# Patient Record
Sex: MALE | Race: OTHER
[De-identification: names, ages, dates, MRNs, and addresses within clinical notes are randomized per-mention and may not be internally consistent; named-entity substitution may affect disease eponyms.]

---

## 2019-06-07 ENCOUNTER — HOSPITAL ENCOUNTER (OUTPATIENT)
Dept: HOSPITAL 8 - ED | Age: 39
LOS: 1 days | Discharge: HOME | End: 2019-06-08
Attending: EMERGENCY MEDICINE
Payer: MEDICAID

## 2019-06-07 VITALS — HEIGHT: 71 IN | BODY MASS INDEX: 21.02 KG/M2 | WEIGHT: 150.13 LBS
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
## 2019-06-07 DIAGNOSIS — Z72.89: ICD-10-CM
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
## 2019-06-07 DIAGNOSIS — S68.127A: Primary | ICD-10-CM

## 2019-06-07 DIAGNOSIS — F17.210: ICD-10-CM
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
## 2019-06-07 DIAGNOSIS — Z23: ICD-10-CM

## 2019-06-07 DIAGNOSIS — F15.10: ICD-10-CM
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
## 2019-06-07 PROCEDURE — 99285 EMERGENCY DEPT VISIT HI MDM: CPT

## 2019-06-07 PROCEDURE — 90471 IMMUNIZATION ADMIN: CPT

## 2019-06-07 PROCEDURE — 26236 PARTIAL REMOVAL FINGER BONE: CPT

## 2019-06-07 PROCEDURE — 90715 TDAP VACCINE 7 YRS/> IM: CPT

## 2019-06-07 PROCEDURE — 73140 X-RAY EXAM OF FINGER(S): CPT

## 2019-06-08 VITALS — DIASTOLIC BLOOD PRESSURE: 88 MMHG | SYSTOLIC BLOOD PRESSURE: 143 MMHG

## 2019-06-08 VITALS — SYSTOLIC BLOOD PRESSURE: 143 MMHG | DIASTOLIC BLOOD PRESSURE: 91 MMHG

## 2019-06-08 VITALS — SYSTOLIC BLOOD PRESSURE: 126 MMHG | DIASTOLIC BLOOD PRESSURE: 78 MMHG

## 2019-06-09 ENCOUNTER — HOSPITAL ENCOUNTER (EMERGENCY)
Facility: MEDICAL CENTER | Age: 39
End: 2019-06-09
Attending: EMERGENCY MEDICINE
Payer: MEDICAID

## 2019-06-09 VITALS
RESPIRATION RATE: 18 BRPM | OXYGEN SATURATION: 96 % | DIASTOLIC BLOOD PRESSURE: 95 MMHG | SYSTOLIC BLOOD PRESSURE: 137 MMHG | HEART RATE: 96 BPM | BODY MASS INDEX: 23.52 KG/M2 | TEMPERATURE: 96.7 F | HEIGHT: 71 IN | WEIGHT: 168 LBS

## 2019-06-09 DIAGNOSIS — G89.18 POST-OPERATIVE PAIN: ICD-10-CM

## 2019-06-09 PROCEDURE — 99283 EMERGENCY DEPT VISIT LOW MDM: CPT

## 2019-06-09 PROCEDURE — A9270 NON-COVERED ITEM OR SERVICE: HCPCS | Performed by: EMERGENCY MEDICINE

## 2019-06-09 PROCEDURE — 700102 HCHG RX REV CODE 250 W/ 637 OVERRIDE(OP): Performed by: EMERGENCY MEDICINE

## 2019-06-09 RX ORDER — HYDROCODONE BITARTRATE AND ACETAMINOPHEN 5; 325 MG/1; MG/1
1 TABLET ORAL EVERY 6 HOURS PRN
Qty: 15 TAB | Refills: 0 | Status: SHIPPED | OUTPATIENT
Start: 2019-06-09 | End: 2019-06-13

## 2019-06-09 RX ORDER — HYDROCODONE BITARTRATE AND ACETAMINOPHEN 10; 325 MG/1; MG/1
1 TABLET ORAL ONCE
Status: COMPLETED | OUTPATIENT
Start: 2019-06-09 | End: 2019-06-09

## 2019-06-09 RX ADMIN — HYDROCODONE BITARTRATE AND ACETAMINOPHEN 1 TABLET: 10; 325 TABLET ORAL at 05:49

## 2019-06-09 ASSESSMENT — ENCOUNTER SYMPTOMS
FEVER: 0
VOMITING: 0

## 2019-06-09 ASSESSMENT — PAIN DESCRIPTION - DESCRIPTORS: DESCRIPTORS: SHOOTING
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
## 2019-06-09 NOTE — ED TRIAGE NOTES
"Josse Lincoln  38 y.o.    /95   Pulse 96   Temp 35.9 °C (96.7 °F) (Temporal)   Resp 18   Ht 1.803 m (5' 11\")   Wt 76.2 kg (168 lb)   SpO2 96%   BMI 23.43 kg/m²   Chief Complaint   Patient presents with   • Post-Op Pain     Left pinky finger amputation at this first joint, sx this am at [facility]. Pt has swelling and pains, given Abx and pain meds with no relief.      Pt amb to triage with steady gait for above complaint.   Pt is alert and oriented, speaking in full sentences, follows commands and responds appropriately to questions. NAD. Resp are even and unlabored.  Pt placed in lobby. Pt educated on triage process and encouraged to alert staff for any changes.     "
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
## 2019-09-06 ENCOUNTER — HOSPITAL ENCOUNTER (EMERGENCY)
Facility: MEDICAL CENTER | Age: 39
End: 2019-09-06
Attending: EMERGENCY MEDICINE
Payer: MEDICAID

## 2019-09-06 VITALS
WEIGHT: 166.23 LBS | TEMPERATURE: 96.8 F | HEIGHT: 71 IN | DIASTOLIC BLOOD PRESSURE: 84 MMHG | SYSTOLIC BLOOD PRESSURE: 123 MMHG | OXYGEN SATURATION: 100 % | HEART RATE: 83 BPM | BODY MASS INDEX: 23.27 KG/M2 | RESPIRATION RATE: 16 BRPM

## 2019-09-06 VITALS
BODY MASS INDEX: 22.84 KG/M2 | HEIGHT: 71 IN | OXYGEN SATURATION: 98 % | DIASTOLIC BLOOD PRESSURE: 78 MMHG | HEART RATE: 82 BPM | SYSTOLIC BLOOD PRESSURE: 129 MMHG | RESPIRATION RATE: 15 BRPM | TEMPERATURE: 98 F | WEIGHT: 163.14 LBS

## 2019-09-06 DIAGNOSIS — K02.9 DENTAL CARIES: ICD-10-CM

## 2019-09-06 DIAGNOSIS — K08.89 PAIN, DENTAL: ICD-10-CM

## 2019-09-06 PROCEDURE — 99284 EMERGENCY DEPT VISIT MOD MDM: CPT

## 2019-09-06 PROCEDURE — A9270 NON-COVERED ITEM OR SERVICE: HCPCS | Performed by: EMERGENCY MEDICINE

## 2019-09-06 PROCEDURE — 700102 HCHG RX REV CODE 250 W/ 637 OVERRIDE(OP): Performed by: EMERGENCY MEDICINE

## 2019-09-06 PROCEDURE — 700101 HCHG RX REV CODE 250: Performed by: EMERGENCY MEDICINE

## 2019-09-06 PROCEDURE — 99283 EMERGENCY DEPT VISIT LOW MDM: CPT

## 2019-09-06 RX ORDER — AMOXICILLIN 875 MG/1
875 TABLET, COATED ORAL 2 TIMES DAILY
Qty: 20 TAB | Refills: 0 | Status: SHIPPED | OUTPATIENT
Start: 2019-09-06

## 2019-09-06 RX ORDER — IBUPROFEN 600 MG/1
600 TABLET ORAL ONCE
Status: COMPLETED | OUTPATIENT
Start: 2019-09-06 | End: 2019-09-06

## 2019-09-06 RX ORDER — BUPIVACAINE HYDROCHLORIDE AND EPINEPHRINE 5; 5 MG/ML; UG/ML
20 INJECTION, SOLUTION EPIDURAL; INTRACAUDAL; PERINEURAL ONCE
Status: DISCONTINUED | OUTPATIENT
Start: 2019-09-06 | End: 2019-09-06 | Stop reason: HOSPADM

## 2019-09-06 RX ORDER — NAPROXEN 500 MG/1
500 TABLET ORAL 2 TIMES DAILY WITH MEALS
Qty: 20 TAB | Refills: 0 | Status: SHIPPED | OUTPATIENT
Start: 2019-09-06

## 2019-09-06 RX ORDER — AMOXICILLIN 500 MG/1
500 CAPSULE ORAL ONCE
Status: COMPLETED | OUTPATIENT
Start: 2019-09-06 | End: 2019-09-06

## 2019-09-06 RX ORDER — LIDOCAINE HYDROCHLORIDE AND EPINEPHRINE BITARTRATE 20; .01 MG/ML; MG/ML
10 INJECTION, SOLUTION SUBCUTANEOUS ONCE
Status: DISCONTINUED | OUTPATIENT
Start: 2019-09-06 | End: 2019-09-06 | Stop reason: HOSPADM

## 2019-09-06 RX ORDER — NAPROXEN 500 MG/1
500 TABLET ORAL 2 TIMES DAILY PRN
Qty: 30 TAB | Refills: 0 | Status: SHIPPED | OUTPATIENT
Start: 2019-09-06

## 2019-09-06 RX ADMIN — IBUPROFEN 600 MG: 600 TABLET ORAL at 15:02

## 2019-09-06 RX ADMIN — AMOXICILLIN 500 MG: 500 CAPSULE ORAL at 15:02

## 2019-09-06 RX ADMIN — IBUPROFEN 600 MG: 600 TABLET ORAL at 03:03

## 2019-09-06 ASSESSMENT — PAIN DESCRIPTION - DESCRIPTORS: DESCRIPTORS: BURNING

## 2019-09-06 NOTE — DISCHARGE INSTRUCTIONS
You were seen in the ER for tooth pain. I gave you a dose of motrin but you have declined a dental block. Please follow up with a dentist tomorrow. I gave you a prescription for naproxen, take it as directed. Return to the ER with new or worsening symptoms.

## 2019-09-06 NOTE — ED PROVIDER NOTES
ED Provider Note    CHIEF COMPLAINT  Chief Complaint   Patient presents with   • Dental Pain     upper left x4d       HPI  Josse Lincoln is a 39 y.o. male who presents to emergency room today with complaints of dental pain left upper molar area.  Patient was seen here earlier apparently left and eloped from hospital.  No nausea no vomiting no chest pain shortness breath or other complaints at this time no fever chills    REVIEW OF SYSTEMS  See HPI for further details. All other systems are negative.      PAST MEDICAL HISTORY  No past medical history on file.    FAMILY HISTORY  No family history on file.    SOCIAL HISTORY  Social History     Socioeconomic History   • Marital status: Single     Spouse name: Not on file   • Number of children: Not on file   • Years of education: Not on file   • Highest education level: Not on file   Occupational History   • Not on file   Social Needs   • Financial resource strain: Not on file   • Food insecurity:     Worry: Not on file     Inability: Not on file   • Transportation needs:     Medical: Not on file     Non-medical: Not on file   Tobacco Use   • Smoking status: Current Every Day Smoker     Packs/day: 0.50     Types: Cigarettes   • Smokeless tobacco: Never Used   Substance and Sexual Activity   • Alcohol use: No   • Drug use: Yes     Comment: meth, weed    • Sexual activity: Not on file   Lifestyle   • Physical activity:     Days per week: Not on file     Minutes per session: Not on file   • Stress: Not on file   Relationships   • Social connections:     Talks on phone: Not on file     Gets together: Not on file     Attends Hinduism service: Not on file     Active member of club or organization: Not on file     Attends meetings of clubs or organizations: Not on file     Relationship status: Not on file   • Intimate partner violence:     Fear of current or ex partner: Not on file     Emotionally abused: Not on file     Physically abused: Not on file     Forced sexual  "activity: Not on file   Other Topics Concern   • Not on file   Social History Narrative   • Not on file       SURGICAL HISTORY  No past surgical history on file.    CURRENT MEDICATIONS  Home Medications     Reviewed by Remedios Dalton R.N. (Registered Nurse) on 09/06/19 at 1423  Med List Status: Complete   Medication Last Dose Status   naproxen (NAPROSYN) 500 MG Tab  Active                ALLERGIES  No Known Allergies    PHYSICAL EXAM  VITAL SIGNS: /93   Pulse 90   Temp 36.2 °C (97.2 °F) (Temporal)   Resp 14   Ht 1.803 m (5' 11\")   Wt 74 kg (163 lb 2.3 oz)   SpO2 98%   BMI 22.75 kg/m²  Room air O2: 98    Constitutional :  Well developed, Well nourished, No acute distress, Non-toxic appearance.   HENT: Multiple dental caries noted left upper molar area no evidence of abscess.  Eyes: perrla  Neck: Normal range of motion, No tenderness, Supple, No stridor.   Lymphatic: No noted.   Cardiovascular: Normal heart rate, Normal rhythm, No murmurs, No rubs, No gallops.   Thorax & Lungs: Clear to auscultation  Skin: Warm, Dry, No erythema, No rash.   Extremities: Intact distal pulses, No edema, No tenderness, No cyanosis, No clubbing.           COURSE & MEDICAL DECISION MAKING  Pertinent Labs & Imaging studies reviewed. (See chart for details)  Patient was given dental referral list placed on amoxicillin and Naprosyn follow-up with dentist as described to return if further problems.    FINAL IMPRESSION  1.  Acute dental pain/dental caries  2.   3.      Electronically signed by: Huy Radford, 9/6/2019    "
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
## 2019-09-06 NOTE — ED NOTES
Received orders for DC. Educated regarding use of oral abx and f/u with dentist as soon as possible. VSS. Given snack per request. Ambulatory to lobby with steady gait.

## 2019-09-06 NOTE — ED PROVIDER NOTES
"ED Provider Note    Scribed for Mukesh Martinez M.D. by Lacey Johnston. 9/6/2019, 2:56 AM.    Primary care provider: None noted  Means of arrival: Walk-in  History obtained from: Patient  History limited by: None    CHIEF COMPLAINT  Chief Complaint   Patient presents with   • Dental Pain     x 4 days       HPI  Josse Lincoln is a 39 y.o. male who presents to the Emergency Department for evaluation of acute, constant, non-radiating left upper dental pain onset four days ago with worsening severity today prompting him to visit the ED today. The patient reports that he has been using Tylenol for his dental pain with minimal alleviation. No exacerbating factors were reported. The patient does not report any symptoms of drainage from the teeth or gums. He does not have a dentist. Denies past medical history of Diabetes. No further past medical or surgical history was reported. He has no known allergies to medications.     REVIEW OF SYSTEMS  Pertinent positives include dental pain. Pertinent negatives include no drainage from the teeth or gums. As above, all other systems reviewed and are negative.   See HPI for further details.     PAST MEDICAL HISTORY  No major past medical history was reported.    SURGICAL HISTORY  patient denies any surgical history    SOCIAL HISTORY  Social History     Tobacco Use   • Smoking status: Current Every Day Smoker     Packs/day: 0.50     Types: Cigarettes   • Smokeless tobacco: Never Used   Substance Use Topics   • Alcohol use: No   • Drug use: Yes     Comment: meth, weed       Social History     Substance and Sexual Activity   Drug Use Yes    Comment: meth, weed        FAMILY HISTORY  History reviewed. No pertinent family history.    CURRENT MEDICATIONS  No major past medical history was reported.    ALLERGIES  No Known Allergies    PHYSICAL EXAM  VITAL SIGNS: /96   Pulse 96   Temp 36 °C (96.8 °F) (Temporal)   Resp 16   Ht 1.803 m (5' 11\")   Wt 75.4 kg (166 lb 3.6 oz)   SpO2 100% "   BMI 23.18 kg/m²   Vitals reviewed.  Constitutional: Alert in no apparent distress.  HENT: No signs of trauma, Bilateral external ears normal, Nose normal. Moist mucous membranes. Tenderness to percussion over the left maxillary molar. Poor dentition throughout. No trismus. No gum fluctuance, drainage or edema . No facial edema or loss of the nasolabial fold.   Eyes: Pupils are equal and reactive, Conjunctiva normal, Non-icteric.   Neck: Normal range of motion, No tenderness, Supple, No stridor.   Lymphatic: No lymphadenopathy noted.   Cardiovascular: Warm and well-perfused.  Thorax & Lungs: Equal excursion bilaterally.  Skin: Warm, Dry, No erythema, No rash.   Musculoskeletal: Good range of motion in all major joints. No major deformities noted.   Psychiatric: Affect normal, Judgment normal, Mood normal.     COURSE & MEDICAL DECISION MAKING  Nursing notes, VS, PMSFHx reviewed in chart.  Differential diagnoses include but not limited to: dental caries, periapical abscess     2:56 AM Patient seen and examined at bedside. Patient arrives slightly hypertensive but afebrile with otherwise normal vital signs. Patient appears well hydrated and non-toxic. The physical exam is remarkable for tenderness to percussion in the left over the left maxillary molar.  Patient has no loss of the nasolabial folds or facial swelling to suggest canine space infection.  No trismus, sublingual, or submental induration.  He does have poor dentition throughout.  I offered the patient a dental block but he has declined.  He was given a dose of ibuprofen.      3:18 AM The patient is stable for discharge. He was given discharge instructions. The patient will be discharged with a prescription for Naprosyn 500 mg which is to be taken twice daily.  He will need dental follow-up.  He was given a referral to [facility] and instructed to immediately return to the ED if his symptoms worsen. Patient verbalizes their understanding and  agreement to plan of discharge.  Notified by nursing staff that the patient left prior to receiving his prescription for naproxen and discharge papers.    The patient will return for new or worsening symptoms and is stable at the time of discharge.    DISPOSITION:  Patient will be discharged home in stable condition.    FOLLOW UP:  [address] [id number] [phone]  Schedule an appointment as soon as possible for a visit in 1 day  to establish with a PCP and follow up with a dentist      OUTPATIENT MEDICATIONS:  Discharge Medication List as of 9/6/2019  3:14 AM      START taking these medications    Details   naproxen (NAPROSYN) 500 MG Tab Take 1 Tab by mouth 2 times a day, with meals., Disp-20 Tab, R-0, Print Rx Paper             FINAL IMPRESSION  1. Pain, dental          Lacey SWIFT (Scribe), am scribing for, and in the presence of, Mukesh Martinez M.D..    Electronically signed by: Lacey Johnston (Scribe), 9/6/2019    IMukesh M.D. personally performed the services described in this documentation, as scribed by Lacey Johnston in my presence, and it is both accurate and complete.    E    The note accurately reflects work and decisions made by me.  Mukesh Martinez  9/6/2019  5:33 AM

## 2019-09-06 NOTE — ED TRIAGE NOTES
Josse Lincoln  39 y.o.  male  Chief Complaint   Patient presents with   • Dental Pain     x 4 days     Present to triage c/o Dental pain ( left upper ) x 4 days.

## 2019-09-07 RX ORDER — NAPROXEN 500 MG/1
500 TABLET ORAL 2 TIMES DAILY PRN
Qty: 20 TAB | Refills: 1 | Status: SHIPPED | OUTPATIENT
Start: 2019-09-07

## 2019-09-07 RX ORDER — AMOXICILLIN 875 MG/1
875 TABLET, COATED ORAL 2 TIMES DAILY
Qty: 20 TAB | Refills: 0 | Status: SHIPPED | OUTPATIENT
Start: 2019-09-07

## 2019-09-08 NOTE — DISCHARGE PLANNING
2040 on 9/7/2019: Patient in ED lobby requesting new discharge prescriptions from yesterday. Reports they accidentally stayed in car and went to [place] with his friend. Spoke with Dr. Radford who reprinted Rxs for Amoxicillin and Naproxen.. Scripts given to patient.

## 2020-07-14 ENCOUNTER — HOSPITAL ENCOUNTER (EMERGENCY)
Dept: HOSPITAL 8 - ED | Age: 40
Discharge: HOME | End: 2020-07-14
Payer: SELF-PAY

## 2020-07-14 VITALS — DIASTOLIC BLOOD PRESSURE: 62 MMHG | SYSTOLIC BLOOD PRESSURE: 119 MMHG

## 2020-07-14 VITALS — HEIGHT: 71 IN | WEIGHT: 151.02 LBS | BODY MASS INDEX: 21.14 KG/M2

## 2020-07-14 DIAGNOSIS — K08.89: Primary | ICD-10-CM

## 2020-07-14 PROCEDURE — 99283 EMERGENCY DEPT VISIT LOW MDM: CPT

## 2020-07-16 ENCOUNTER — HOSPITAL ENCOUNTER (EMERGENCY)
Dept: HOSPITAL 8 - ED | Age: 40
Discharge: HOME | End: 2020-07-16
Payer: SELF-PAY

## 2020-07-16 ENCOUNTER — HOSPITAL ENCOUNTER (EMERGENCY)
Facility: MEDICAL CENTER | Age: 40
End: 2020-07-17
Attending: EMERGENCY MEDICINE

## 2020-07-16 VITALS — HEIGHT: 71 IN | BODY MASS INDEX: 21.11 KG/M2 | WEIGHT: 150.8 LBS

## 2020-07-16 VITALS — DIASTOLIC BLOOD PRESSURE: 83 MMHG | SYSTOLIC BLOOD PRESSURE: 129 MMHG

## 2020-07-16 DIAGNOSIS — K08.89: Primary | ICD-10-CM

## 2020-07-16 DIAGNOSIS — K04.7 DENTAL ABSCESS: ICD-10-CM

## 2020-07-16 PROCEDURE — 96372 THER/PROPH/DIAG INJ SC/IM: CPT

## 2020-07-16 PROCEDURE — 303977 HCHG I & D

## 2020-07-16 PROCEDURE — 99283 EMERGENCY DEPT VISIT LOW MDM: CPT

## 2020-07-16 RX ORDER — AMOXICILLIN AND CLAVULANATE POTASSIUM 875; 125 MG/1; MG/1
1 TABLET, FILM COATED ORAL ONCE
Status: COMPLETED | OUTPATIENT
Start: 2020-07-17 | End: 2020-07-17

## 2020-07-16 RX ORDER — BUPIVACAINE HYDROCHLORIDE AND EPINEPHRINE 5; 5 MG/ML; UG/ML
10 INJECTION, SOLUTION EPIDURAL; INTRACAUDAL; PERINEURAL ONCE
Status: COMPLETED | OUTPATIENT
Start: 2020-07-17 | End: 2020-07-17

## 2020-07-16 NOTE — NUR
JANE RUIZ NOW BS FOR EXAM.  C/O RT UPPER AND LT LOWER DENTAL PAIN. SWELLING 
NOTED TO LT JAW. WAS SEEN IN ED ON 7/14/20; REPORTS UNABLE TO FILL 
PRESCRIPTIONS.  TOOK OTC PAIN MED TODAY.  SPEECH CLEAR, RESP UNLABORED.

## 2020-07-17 VITALS
HEART RATE: 76 BPM | BODY MASS INDEX: 21.65 KG/M2 | WEIGHT: 155.2 LBS | OXYGEN SATURATION: 97 % | DIASTOLIC BLOOD PRESSURE: 94 MMHG | RESPIRATION RATE: 19 BRPM | TEMPERATURE: 98.7 F | SYSTOLIC BLOOD PRESSURE: 145 MMHG

## 2020-07-17 PROCEDURE — A9270 NON-COVERED ITEM OR SERVICE: HCPCS | Performed by: EMERGENCY MEDICINE

## 2020-07-17 PROCEDURE — 64400 NJX AA&/STRD TRIGEMINAL NRV: CPT

## 2020-07-17 PROCEDURE — 303977 HCHG I & D

## 2020-07-17 PROCEDURE — 700101 HCHG RX REV CODE 250: Performed by: EMERGENCY MEDICINE

## 2020-07-17 PROCEDURE — 700102 HCHG RX REV CODE 250 W/ 637 OVERRIDE(OP): Performed by: EMERGENCY MEDICINE

## 2020-07-17 RX ORDER — AMOXICILLIN AND CLAVULANATE POTASSIUM 875; 125 MG/1; MG/1
1 TABLET, FILM COATED ORAL 2 TIMES DAILY
Qty: 14 TAB | Refills: 0 | Status: SHIPPED | OUTPATIENT
Start: 2020-07-17 | End: 2020-07-24

## 2020-07-17 RX ADMIN — AMOXICILLIN AND CLAVULANATE POTASSIUM 1 TABLET: 875; 125 TABLET, FILM COATED ORAL at 00:54

## 2020-07-17 RX ADMIN — BUPIVACAINE HYDROCHLORIDE AND EPINEPHRINE BITARTRATE 10 ML: 5; .005 INJECTION, SOLUTION EPIDURAL; INTRACAUDAL; PERINEURAL at 00:45

## 2020-07-17 NOTE — ED TRIAGE NOTES
Chief Complaint   Patient presents with   • Dental Pain     left lower and right upper x1 week. swelling noted to left jaw         Pt to triage for above complaint. States he was given amoxicillin from a friend and took one today. Swelling noted to left lower jaw.       Pt/staff masked and in appropriate PPE during encounter.  Pt denies fever/travel or being in contact with anyone testing positive for COVID/Corona.  Explained to pt triage process, made pt aware to tell this RN/staff of any changes/concerns, pt verbalized understanding of process and instructions given. Pt to ER lobby.

## 2020-07-17 NOTE — ED PROVIDER NOTES
ED Provider Note    CHIEF COMPLAINT  Chief Complaint   Patient presents with   • Dental Pain     left lower and right upper x1 week. swelling noted to left jaw       HPI  Josse Lincoln is a 39 y.o. male who presents for evaluation of dental pain.  The patient reports a loose tooth in his right upper row of molars but also pain at the angle of the mandible on the left lower jaw.  Patient apparently has a history of very poor dentition.  He has an appointment with a dentist tomorrow.  He denies high fevers or chills.  He has no significant medical or surgical history.  No other complaints    REVIEW OF SYSTEMS  See HPI for further details.  No high fevers chills night sweats weight loss all other systems are negative.     PAST MEDICAL HISTORY  No past medical history on file.  No stated medical history  FAMILY HISTORY  Noncontributory    SOCIAL HISTORY  Social History     Socioeconomic History   • Marital status: Single     Spouse name: Not on file   • Number of children: Not on file   • Years of education: Not on file   • Highest education level: Not on file   Occupational History   • Not on file   Social Needs   • Financial resource strain: Not on file   • Food insecurity     Worry: Not on file     Inability: Not on file   • Transportation needs     Medical: Not on file     Non-medical: Not on file   Tobacco Use   • Smoking status: Current Every Day Smoker     Packs/day: 0.50     Types: Cigarettes   • Smokeless tobacco: Never Used   Substance and Sexual Activity   • Alcohol use: No   • Drug use: Yes     Types: Inhaled     Comment: marijuana   • Sexual activity: Not on file   Lifestyle   • Physical activity     Days per week: Not on file     Minutes per session: Not on file   • Stress: Not on file   Relationships   • Social connections     Talks on phone: Not on file     Gets together: Not on file     Attends Muslim service: Not on file     Active member of club or organization: Not on file     Attends meetings of  clubs or organizations: Not on file     Relationship status: Not on file   • Intimate partner violence     Fear of current or ex partner: Not on file     Emotionally abused: Not on file     Physically abused: Not on file     Forced sexual activity: Not on file   Other Topics Concern   • Not on file   Social History Narrative   • Not on file     Denies IV drugs  SURGICAL HISTORY  No past surgical history on file.    CURRENT MEDICATIONS  Home Medications    **Home medications have not yet been reviewed for this encounter**       No regular meds  ALLERGIES  No Known Allergies    PHYSICAL EXAM  VITAL SIGNS: /94   Pulse 76   Temp 36.6 °C (97.9 °F) (Temporal)   Resp 19   Wt 70.4 kg (155 lb 3.3 oz)   SpO2 97%   BMI 21.65 kg/m²       Constitutional: Well developed, Well nourished, No acute distress, Non-toxic appearance.   HENT: Normocephalic, Atraumatic, Bilateral external ears normal, Oropharynx moist, No oral exudates, Nose normal.  Widespread poor dentition.  There is a loose right upper second molar without any significant swelling.  The angle of the mandible on the left side is minimally swollen possible early abscess.  No tracking down below the angle of the mandible or into the neck no crepitus  Eyes: PERRLA, EOMI, Conjunctiva normal, No discharge.   Neck: Normal range of motion, No tenderness, Supple, No stridor.   Lymphatic: No lymphadenopathy noted.   Cardiovascular: Normal heart rate, Normal rhythm, No murmurs, No rubs, No gallops.   Thorax & Lungs: Normal breath sounds, No respiratory distress, No wheezing, No chest tenderness.   Abdomen: Bowel sounds normal, Soft, No tenderness, No masses, No pulsatile masses.   Skin: Warm, Dry, No erythema, No rash.   Extremities: Intact distal pulses, No edema, No tenderness, No cyanosis, No clubbing.   Neurologic: Alert & oriented x 3, Normal motor function, Normal sensory function, No focal deficits noted.   Psychiatric: Anxious        COURSE & MEDICAL DECISION  MAKING  Pertinent Labs & Imaging studies reviewed. (See chart for details)  Physician procedure: Inferior alveolar nerve block with drainage of dental abscess.  Verbal consent was obtained.  Standard approach inferior alveolar nerve block was performed on the left side.  A total of 4 cc of 0.5% bupivacaine with epinephrine was infiltrated.  An 11 blade scalpel was used to make a small stab incision over the point of maximal fluctuance.  Small amount of pus was evacuated.  The patient irrigated his mouth.  No complications    Patient was given first dose of Augmentin here.  I will provide him a prescription for the same.  He has an appointment with dental specialist tomorrow.  Return precautions have been reviewed    FINAL IMPRESSION  1.  Dental abscess         Electronically signed by: Avelino Tam M.D., 7/17/2020 12:27 AM

## 2020-07-17 NOTE — ED NOTES
Discharge summary completed with patient. Patient education completed regarding follow up care, and new medications. Patient ambulated out to lobby with steady gait. All belongings sent home with patient.

## 2020-08-18 ENCOUNTER — HOSPITAL ENCOUNTER (EMERGENCY)
Facility: MEDICAL CENTER | Age: 40
End: 2020-08-18
Attending: EMERGENCY MEDICINE | Admitting: EMERGENCY MEDICINE

## 2020-08-18 VITALS
SYSTOLIC BLOOD PRESSURE: 124 MMHG | OXYGEN SATURATION: 99 % | HEART RATE: 73 BPM | BODY MASS INDEX: 21.98 KG/M2 | HEIGHT: 71 IN | WEIGHT: 156.97 LBS | RESPIRATION RATE: 16 BRPM | TEMPERATURE: 96.8 F | DIASTOLIC BLOOD PRESSURE: 79 MMHG

## 2020-08-18 DIAGNOSIS — R36.9 PENILE DISCHARGE: ICD-10-CM

## 2020-08-18 DIAGNOSIS — Z20.2 STD EXPOSURE: ICD-10-CM

## 2020-08-18 LAB
C TRACH DNA SPEC QL NAA+PROBE: NEGATIVE
N GONORRHOEA DNA SPEC QL NAA+PROBE: POSITIVE
SPECIMEN SOURCE: ABNORMAL

## 2020-08-18 PROCEDURE — 700111 HCHG RX REV CODE 636 W/ 250 OVERRIDE (IP): Performed by: EMERGENCY MEDICINE

## 2020-08-18 PROCEDURE — 96372 THER/PROPH/DIAG INJ SC/IM: CPT

## 2020-08-18 PROCEDURE — 87491 CHLMYD TRACH DNA AMP PROBE: CPT

## 2020-08-18 PROCEDURE — 700102 HCHG RX REV CODE 250 W/ 637 OVERRIDE(OP): Performed by: EMERGENCY MEDICINE

## 2020-08-18 PROCEDURE — 99284 EMERGENCY DEPT VISIT MOD MDM: CPT

## 2020-08-18 PROCEDURE — 87591 N.GONORRHOEAE DNA AMP PROB: CPT

## 2020-08-18 PROCEDURE — A9270 NON-COVERED ITEM OR SERVICE: HCPCS | Performed by: EMERGENCY MEDICINE

## 2020-08-18 RX ORDER — CEFTRIAXONE SODIUM 250 MG/1
250 INJECTION, POWDER, FOR SOLUTION INTRAMUSCULAR; INTRAVENOUS ONCE
Status: COMPLETED | OUTPATIENT
Start: 2020-08-18 | End: 2020-08-18

## 2020-08-18 RX ORDER — AZITHROMYCIN 250 MG/1
1000 TABLET, FILM COATED ORAL ONCE
Status: COMPLETED | OUTPATIENT
Start: 2020-08-18 | End: 2020-08-18

## 2020-08-18 RX ADMIN — CEFTRIAXONE SODIUM 250 MG: 250 INJECTION, POWDER, FOR SOLUTION INTRAMUSCULAR; INTRAVENOUS at 13:02

## 2020-08-18 RX ADMIN — AZITHROMYCIN MONOHYDRATE 1000 MG: 250 TABLET ORAL at 13:01

## 2020-08-18 ASSESSMENT — ENCOUNTER SYMPTOMS
VOMITING: 0
SHORTNESS OF BREATH: 0
NAUSEA: 0
FEVER: 0

## 2020-08-18 NOTE — LETTER
8/20/2020               [facility] [id number]        Dear Josse (MR#[id number])    As we have been unable to contact you by phone, this letter is sent in regards to your, recent visit to the [facility] Emergency Department on 8/18/2020.  During the visit, tests were performed to assist the physician in a medical diagnosis.  A review of those tests requires that we notify you of the following:    Your culture test was positive for Gonorrhea, a sexually transmitted infection. This was already treated appropriately in the Emergency Department. .       Based on the above findings it is recommended that you seek testing for the presence of additional sexually transmitted infections from the Health Department. Also, it is advised that you inform your sexual partner(s) within the previous 60 days of the above findings and direct them to the Health Department for testing. Should your symptoms progress, it is important that you follow up with your primary care physician, your local urgent care office, or return to the emergency department for further work up in order to prevent long term health issues.      Thank you for your cooperation in the matter.    Sincerely,  ED Culture Follow-Up Staff  Porfirio Finley PharmD    [facility], Emergency Department  [address] [place] [id number]  [phone] (ED Culture Line)  [phone]
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
## 2020-08-18 NOTE — ED NOTES
Patient resting on stretcher in no acute distress. Equal chest rise noted. Bed locked and in low position. Call bell within reach

## 2020-08-18 NOTE — ED NOTES
Discharge instructions reviewed with patient and signed. He has all his belongings and ambulates with a steady gait. Verbalized understanding of follow up instructions
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
## 2020-08-20 NOTE — ED NOTES
"ED Positive Culture Follow-up/Notification Note:    Date: 8/20/20     Patient seen in the ED on 8/18/2020 for penile discharge and exposure to STD.   1. Penile discharge    2. STD exposure       Discharge Medication List as of 8/18/2020  1:17 PM          Allergies: Patient has no known allergies.      Vitals:    08/18/20 1141 08/18/20 1143 08/18/20 1405   BP: 116/80  124/79   Pulse: 96  73   Resp: 16     Temp: 36 °C (96.8 °F)     TempSrc: Temporal     SpO2: 98%  99%   Weight:  71.2 kg (156 lb 15.5 oz)    Height: 1.803 m (5' 11\") 1.803 m (5' 11\")        Final cultures:   Results     Procedure Component Value Units Date/Time    CHLAMYDIA & GC BY PCR [[id number]]  (Abnormal) Collected: 08/18/20 1214    Order Status: Completed Specimen: Genital Updated: 08/18/20 1808     C. trachomatis by PCR Negative     N. gonorrhoeae by PCR POSITIVE     Source Urine           Plan:   Appropriate antibiotic therapy prescribed (Pt was given azithromycin 1g PO x 1 and  mg IM x 1 in the ED). No changes required based upon culture result.    Attempted to call pt to notify of positive culture result and adequate treatment, but was unable to reach him. Pt has no address on file so I am unable to send him a letter. Given pt was treated adequately in the ED will not make any further attempts to follow up at this time.    Porfirio Finley, PharmD    "

## 2021-07-02 NOTE — DISCHARGE INSTRUCTIONS
It is very important, that you follow-up with Dr. Ford, the orthopedist who operated on your finger, as indicated in your discharge papers.  Keep your hand elevated.  Pain medication as directed.   Intermediate Repair Preamble Text (Leave Blank If You Do Not Want): Undermining was performed with blunt dissection.

## 2021-12-11 ENCOUNTER — APPOINTMENT (OUTPATIENT)
Dept: RADIOLOGY | Facility: MEDICAL CENTER | Age: 41
End: 2021-12-11
Attending: EMERGENCY MEDICINE

## 2021-12-11 ENCOUNTER — HOSPITAL ENCOUNTER (EMERGENCY)
Facility: MEDICAL CENTER | Age: 41
End: 2021-12-11
Attending: EMERGENCY MEDICINE

## 2021-12-11 VITALS
HEIGHT: 71 IN | HEART RATE: 95 BPM | BODY MASS INDEX: 23.4 KG/M2 | SYSTOLIC BLOOD PRESSURE: 145 MMHG | TEMPERATURE: 97.5 F | DIASTOLIC BLOOD PRESSURE: 96 MMHG | OXYGEN SATURATION: 98 % | WEIGHT: 167.11 LBS | RESPIRATION RATE: 18 BRPM

## 2021-12-11 DIAGNOSIS — S29.9XXA INJURY OF CHEST WALL, INITIAL ENCOUNTER: ICD-10-CM

## 2021-12-11 PROCEDURE — 700102 HCHG RX REV CODE 250 W/ 637 OVERRIDE(OP): Performed by: EMERGENCY MEDICINE

## 2021-12-11 PROCEDURE — 71101 X-RAY EXAM UNILAT RIBS/CHEST: CPT | Mod: RT

## 2021-12-11 PROCEDURE — 99284 EMERGENCY DEPT VISIT MOD MDM: CPT

## 2021-12-11 PROCEDURE — A9270 NON-COVERED ITEM OR SERVICE: HCPCS | Performed by: EMERGENCY MEDICINE

## 2021-12-11 PROCEDURE — 700101 HCHG RX REV CODE 250: Performed by: EMERGENCY MEDICINE

## 2021-12-11 RX ORDER — IBUPROFEN 400 MG/1
400 TABLET ORAL EVERY 8 HOURS PRN
Qty: 20 TABLET | Refills: 0 | Status: SHIPPED | OUTPATIENT
Start: 2021-12-11

## 2021-12-11 RX ORDER — ALBUTEROL SULFATE 90 UG/1
2 AEROSOL, METERED RESPIRATORY (INHALATION) EVERY 6 HOURS PRN
Qty: 8.5 G | Refills: 0 | Status: SHIPPED | OUTPATIENT
Start: 2021-12-11

## 2021-12-11 RX ORDER — ALBUTEROL SULFATE 90 UG/1
2 AEROSOL, METERED RESPIRATORY (INHALATION) ONCE
Status: COMPLETED | OUTPATIENT
Start: 2021-12-11 | End: 2021-12-11

## 2021-12-11 RX ORDER — ACETAMINOPHEN 500 MG
1000 TABLET ORAL EVERY 8 HOURS PRN
Qty: 20 TABLET | Refills: 0 | Status: SHIPPED | OUTPATIENT
Start: 2021-12-11

## 2021-12-11 RX ORDER — LIDOCAINE 50 MG/G
1 PATCH TOPICAL ONCE
Status: DISCONTINUED | OUTPATIENT
Start: 2021-12-11 | End: 2021-12-11 | Stop reason: HOSPADM

## 2021-12-11 RX ORDER — HYDROCODONE BITARTRATE AND ACETAMINOPHEN 5; 325 MG/1; MG/1
1 TABLET ORAL ONCE
Status: COMPLETED | OUTPATIENT
Start: 2021-12-11 | End: 2021-12-11

## 2021-12-11 RX ADMIN — LIDOCAINE 1 PATCH: 50 PATCH TOPICAL at 20:04

## 2021-12-11 RX ADMIN — HYDROCODONE BITARTRATE AND ACETAMINOPHEN 1 TABLET: 5; 325 TABLET ORAL at 20:05

## 2021-12-11 RX ADMIN — ALBUTEROL SULFATE 2 PUFF: 90 AEROSOL, METERED RESPIRATORY (INHALATION) at 20:04

## 2021-12-11 ASSESSMENT — LIFESTYLE VARIABLES
ON A TYPICAL DAY WHEN YOU DRINK ALCOHOL HOW MANY DRINKS DO YOU HAVE: 0
TOTAL SCORE: 0
CONSUMPTION TOTAL: NEGATIVE
TOTAL SCORE: 0
EVER FELT BAD OR GUILTY ABOUT YOUR DRINKING: NO
HAVE PEOPLE ANNOYED YOU BY CRITICIZING YOUR DRINKING: NO
EVER HAD A DRINK FIRST THING IN THE MORNING TO STEADY YOUR NERVES TO GET RID OF A HANGOVER: NO
DO YOU DRINK ALCOHOL: YES
HOW MANY TIMES IN THE PAST YEAR HAVE YOU HAD 5 OR MORE DRINKS IN A DAY: 0
HAVE YOU EVER FELT YOU SHOULD CUT DOWN ON YOUR DRINKING: NO
AVERAGE NUMBER OF DAYS PER WEEK YOU HAVE A DRINK CONTAINING ALCOHOL: 0
TOTAL SCORE: 0

## 2021-12-12 NOTE — ED NOTES
Discharge teaching and paperwork provided regarding chest trauma and all questions/concerns answered. VSS, pain assessment stable. Given information regarding home care and reasons to follow up with ED or primary MD. Patient provided tylenol, ibuprofen and albuterol Rx. Patient discharged to the care of self and ambulated out of the ED.
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
